# Patient Record
Sex: MALE | Race: WHITE | NOT HISPANIC OR LATINO | Employment: OTHER | URBAN - METROPOLITAN AREA
[De-identification: names, ages, dates, MRNs, and addresses within clinical notes are randomized per-mention and may not be internally consistent; named-entity substitution may affect disease eponyms.]

---

## 2022-04-10 ENCOUNTER — HISTORICAL (OUTPATIENT)
Dept: ADMINISTRATIVE | Facility: HOSPITAL | Age: 87
End: 2022-04-10

## 2022-04-28 VITALS
HEIGHT: 68 IN | SYSTOLIC BLOOD PRESSURE: 132 MMHG | DIASTOLIC BLOOD PRESSURE: 86 MMHG | WEIGHT: 162 LBS | BODY MASS INDEX: 24.55 KG/M2

## 2022-09-28 RX ORDER — PROPRANOLOL HYDROCHLORIDE 160 MG/1
CAPSULE, EXTENDED RELEASE ORAL
COMMUNITY
End: 2022-11-10

## 2022-09-28 RX ORDER — PRAVASTATIN SODIUM 40 MG/1
TABLET ORAL
COMMUNITY
Start: 2022-07-06

## 2022-09-28 RX ORDER — LISINOPRIL 10 MG/1
TABLET ORAL
COMMUNITY
Start: 2022-06-14

## 2022-09-28 RX ORDER — BENAZEPRIL HYDROCHLORIDE 40 MG/1
TABLET ORAL
COMMUNITY
End: 2022-11-10

## 2022-09-28 RX ORDER — TRIAMTERENE AND HYDROCHLOROTHIAZIDE 37.5; 25 MG/1; MG/1
CAPSULE ORAL
COMMUNITY
Start: 2022-07-06

## 2022-09-28 RX ORDER — METOPROLOL TARTRATE 50 MG/1
TABLET ORAL
COMMUNITY

## 2022-09-28 RX ORDER — FENOFIBRATE 160 MG/1
TABLET ORAL
COMMUNITY
Start: 2022-07-06

## 2022-09-28 RX ORDER — ALLOPURINOL 300 MG/1
TABLET ORAL
COMMUNITY
Start: 2022-07-06

## 2022-09-28 RX ORDER — AMLODIPINE BESYLATE 10 MG/1
TABLET ORAL
COMMUNITY
Start: 2022-07-06

## 2022-11-10 ENCOUNTER — OFFICE VISIT (OUTPATIENT)
Dept: NEUROLOGY | Facility: CLINIC | Age: 87
End: 2022-11-10
Payer: MEDICARE

## 2022-11-10 VITALS
SYSTOLIC BLOOD PRESSURE: 148 MMHG | DIASTOLIC BLOOD PRESSURE: 74 MMHG | BODY MASS INDEX: 25.9 KG/M2 | WEIGHT: 165 LBS | HEIGHT: 67 IN

## 2022-11-10 DIAGNOSIS — G25.0 ESSENTIAL TREMOR: Primary | ICD-10-CM

## 2022-11-10 DIAGNOSIS — M54.41 ACUTE RIGHT-SIDED LOW BACK PAIN WITH RIGHT-SIDED SCIATICA: ICD-10-CM

## 2022-11-10 PROCEDURE — 99213 OFFICE O/P EST LOW 20 MIN: CPT | Mod: PBBFAC | Performed by: NURSE PRACTITIONER

## 2022-11-10 PROCEDURE — 99999 PR PBB SHADOW E&M-EST. PATIENT-LVL III: ICD-10-PCS | Mod: PBBFAC,,, | Performed by: NURSE PRACTITIONER

## 2022-11-10 PROCEDURE — 99999 PR PBB SHADOW E&M-EST. PATIENT-LVL III: CPT | Mod: PBBFAC,,, | Performed by: NURSE PRACTITIONER

## 2022-11-10 PROCEDURE — 99213 PR OFFICE/OUTPT VISIT, EST, LEVL III, 20-29 MIN: ICD-10-PCS | Mod: S$PBB,,, | Performed by: NURSE PRACTITIONER

## 2022-11-10 PROCEDURE — 99213 OFFICE O/P EST LOW 20 MIN: CPT | Mod: S$PBB,,, | Performed by: NURSE PRACTITIONER

## 2022-11-10 RX ORDER — ZONISAMIDE 100 MG/1
300 CAPSULE ORAL NIGHTLY
Qty: 270 CAPSULE | Refills: 3 | Status: SHIPPED | OUTPATIENT
Start: 2022-11-10 | End: 2023-09-25 | Stop reason: SDUPTHER

## 2022-11-10 NOTE — PROGRESS NOTES
Subjective:           Patient ID: David Jackson is a 86 y.o. male.    Chief Complaint: F/U Tremor.      HPI:            Pt states his B hand tremors are getting worse.     Increased Zonisamide to 200 mg daily. Denies Kidney stones     On scale of 1-10, states 8 as far as tremor being bothersome.     denies unbalanced gait, swallowing diff, or recent falls.   Denies choking, diplopia, or ptosis. Denies shortness of breath     sleeping well.     Drinks a beer and shot of whiskey every night - tremor improves afterward     diff with holding objects and writing    Drives w/o fender gutierrez or traffic violations    Limited ROM BUE, unable to lift arms above head x 12 months - denies bladder incontinence. Denies shoulder pain.     Reports lumbago with sciatic type pain in the RLE, occ has pain in R buttocks, interferes with ability to walk long distances or standing for long periods of time. Laying down makes it better as well as stretching and elevating the leg.     ROS: as per HPI, otherwise pertinent systems review is negative          Past Medical History:   Diagnosis Date    Cancer     Enlarged prostate     Essential tremor     Hypertension     TIFF (obstructive sleep apnea)        Past Surgical History:   Procedure Laterality Date    APPENDECTOMY      INGUINAL HERNIA REPAIR Bilateral     lung mass      LUNG REMOVAL, PARTIAL      THORACOTOMY Right     TRANSURETHRAL RESECTION OF PROSTATE         Family History   Problem Relation Age of Onset    Hypertension Mother     Hypertension Father     Aneurysm Father        Social History     Socioeconomic History    Marital status:    Tobacco Use    Smoking status: Former     Types: Cigarettes    Smokeless tobacco: Never       Review of patient's allergies indicates:   Allergen Reactions    Indomethacin        Current Outpatient Medications:     allopurinoL (ZYLOPRIM) 300 MG tablet, , Disp: , Rfl:     amLODIPine (NORVASC) 10 MG tablet, , Disp: , Rfl:     edoxaban  "(SAVAYSA) 60 mg Tab tablet, Savaysa 60 mg tablet  Take 1 tablet every day by oral route., Disp: , Rfl:     fenofibrate 160 MG Tab, , Disp: , Rfl:     lisinopriL 10 MG tablet, lisinopril 10 mg tablet, Disp: , Rfl:     metoprolol tartrate (LOPRESSOR) 50 MG tablet, metoprolol tartrate 50 mg tablet  TAKE 1 TABLET THREE TIMES DAILY, Disp: , Rfl:     pravastatin (PRAVACHOL) 40 MG tablet, , Disp: , Rfl:     triamterene-hydrochlorothiazide 37.5-25 mg (DYAZIDE) 37.5-25 mg per capsule, , Disp: , Rfl:     zonisamide (ZONEGRAN) 100 MG Cap, Take 3 capsules (300 mg total) by mouth every evening., Disp: 270 capsule, Rfl: 3     Objective:      Exam:   BP (!) 148/74   Ht 5' 7" (1.702 m)   Wt 74.8 kg (165 lb)   BMI 25.84 kg/m²     Physical Exam  Vitals reviewed.   General Exam  Accompanied by - daughter  appearance - well appearing, no apparent distress, unassisted  heart - irregular  lungs - CTA on anterior chest  skin- no obvious lesions noted  some areas of ecchymosis, muscle bulk ok; scab crown of head - followed by derm      Neurological  cortical function - The patient is alert, attentive  Speech - tremorous  cranial nerves:       CN 3, 4, 6 EOMs - normal. No ptosis or lateral gaze deviation       CN 7 - no face asymmetry; normal eye closure and smile       CN 8 - hearing is grossly normal  motor - grossly normal; stands easily from chair with arms folded   Cheek puff nml; tongue protrudes midline  No ptosis  VF's nml  1+ B Kj''s, absent ankle reflexes  B wrist rigidity  No ankle clonus  gait - unassisted; does not look like PD  BUE tremor (rest and action); mouth tremor    No features of UMN disease    Limited ROM BUE - unable to lift above head; denies neck pain      Dr. Jimenez physically present in office suite during patient encounter.       Assessment/Plan:     Problem List Items Addressed This Visit          Neuro    Essential tremor - Primary    Current Assessment & Plan     Increase he Zonisamide to 3 caps at bed " time     Prior meds: Clonazepam and Propranolol      Lives alone; daughter just a few doors down      Still drives; driving discussed      He is averse to DBS    I ask that PCP consider switching the Metoprolol back to Inderal  mg daily; will defer to PCP.      FU in 1 year            Orthopedic    Acute right-sided low back pain with right-sided sciatica    Current Assessment & Plan     Advise pt try Tylenol over the counter; avoid NSAIDs such as Advil/Aleve given the use of anticoagulation therapy and increase risk of bleeding.    Ice/heat therapy    Offered referral to PT - he will talk to PCP regarding this in coming days during OV    Discussed role of imaging such as CT or MRI L spine MRI w/o contrast - I'd suggest such if conservative measures are not fruitful. Patient did mention that he is averse to back surgery. This likely represents foraminal stenosis and or lumbar radicular pain vs L spine stenosis/spondylosis.                   John Cosme, MSN, APRN, AGACNP-BC

## 2022-11-10 NOTE — ASSESSMENT & PLAN NOTE
Increase he Zonisamide to 3 caps at bed time     Prior meds: Clonazepam and Propranolol      Lives alone; daughter just a few doors down      Still drives; driving discussed      He is averse to DBS    I ask that PCP consider switching the Metoprolol back to Inderal  mg daily; will defer to PCP.      FU in 1 year

## 2022-11-10 NOTE — ASSESSMENT & PLAN NOTE
Advise pt try Tylenol over the counter; avoid NSAIDs such as Advil/Aleve given the use of anticoagulation therapy and increase risk of bleeding.    Ice/heat therapy    Offered referral to PT - he will talk to PCP regarding this in coming days during OV    Discussed role of imaging such as CT or MRI L spine MRI w/o contrast - I'd suggest such if conservative measures are not fruitful. Patient did mention that he is averse to back surgery. This likely represents foraminal stenosis and or lumbar radicular pain vs L spine stenosis/spondylosis.

## 2023-09-25 DIAGNOSIS — G25.0 ESSENTIAL TREMOR: ICD-10-CM

## 2023-09-25 RX ORDER — ZONISAMIDE 100 MG/1
300 CAPSULE ORAL NIGHTLY
Qty: 270 CAPSULE | Refills: 3 | Status: SHIPPED | OUTPATIENT
Start: 2023-09-25

## 2024-10-15 DIAGNOSIS — G25.0 ESSENTIAL TREMOR: ICD-10-CM

## 2024-10-16 RX ORDER — ZONISAMIDE 100 MG/1
300 CAPSULE ORAL NIGHTLY
Qty: 270 CAPSULE | Refills: 0 | Status: SHIPPED | OUTPATIENT
Start: 2024-10-16

## 2024-10-29 ENCOUNTER — OFFICE VISIT (OUTPATIENT)
Dept: NEUROLOGY | Facility: CLINIC | Age: 89
End: 2024-10-29
Payer: MEDICARE

## 2024-10-29 VITALS
WEIGHT: 165 LBS | HEIGHT: 67 IN | SYSTOLIC BLOOD PRESSURE: 124 MMHG | BODY MASS INDEX: 25.9 KG/M2 | DIASTOLIC BLOOD PRESSURE: 86 MMHG

## 2024-10-29 DIAGNOSIS — G25.0 ESSENTIAL TREMOR: Primary | ICD-10-CM

## 2024-10-29 PROCEDURE — 99213 OFFICE O/P EST LOW 20 MIN: CPT | Mod: PBBFAC | Performed by: NURSE PRACTITIONER

## 2024-10-29 PROCEDURE — 99999 PR PBB SHADOW E&M-EST. PATIENT-LVL III: CPT | Mod: PBBFAC,,, | Performed by: NURSE PRACTITIONER

## 2024-10-29 PROCEDURE — 99214 OFFICE O/P EST MOD 30 MIN: CPT | Mod: S$PBB,,, | Performed by: NURSE PRACTITIONER

## 2024-10-29 RX ORDER — VALSARTAN AND HYDROCHLOROTHIAZIDE 320; 12.5 MG/1; MG/1
1 TABLET, FILM COATED ORAL DAILY
COMMUNITY

## 2024-10-29 RX ORDER — METOPROLOL TARTRATE 100 MG/1
100 TABLET ORAL 2 TIMES DAILY
COMMUNITY
Start: 2024-10-06

## 2024-10-29 RX ORDER — ZONISAMIDE 100 MG/1
300 CAPSULE ORAL NIGHTLY
Qty: 270 CAPSULE | Refills: 3 | Status: SHIPPED | OUTPATIENT
Start: 2024-10-29